# Patient Record
Sex: MALE
[De-identification: names, ages, dates, MRNs, and addresses within clinical notes are randomized per-mention and may not be internally consistent; named-entity substitution may affect disease eponyms.]

---

## 2022-01-05 ENCOUNTER — NURSE TRIAGE (OUTPATIENT)
Dept: OTHER | Facility: CLINIC | Age: 27
End: 2022-01-05

## 2022-01-05 NOTE — TELEPHONE ENCOUNTER
Subjective: Caller states \"I tested positive for COVID 20 days ago. I had a fever for a few days and then it went away. I had a stuffy nose. I did isolate. I got antibody infusion on Sunday after being tested positive. Patient started having pain on right side of chest  On Saturday that feels like a little pressure, could be anxiety and goes to his back. Patient does not have any other signs or symptoms at this time and no chest pain at this time. \"     Current Symptoms: chest pain on right side. Thirsty all the time. Onset: 5 days ago; sudden    Associated Symptoms: NA    Pain Severity: 4/10; pressure; intermittent    Temperature: none     What has been tried: none    LMP: NA Pregnant: NA    Recommended disposition: See PCP within 24 hours. Care advice provided, patient verbalizes understanding; denies any other questions or concerns; instructed to call back for any new or worsening symptoms. Patient/caller to follow-up with PCP Caller does not have a PCP but is going to go to [facility] to be seen and will try to get established PCP. This triage is a result of a call to [address] [place] [address]. Please do not respond to the triage nurse through this encounter. Any subsequent communication should be directly with the patient. Reason for Disposition   [1] Chest pain lasts > 5 minutes AND [2] occurred > 3 days ago (72 hours) AND [3] NO chest pain or cardiac symptoms now    Answer Assessment - Initial Assessment Questions  1. LOCATION: \"Where does it hurt? \"        Right side chest pain that comes and goes, with increased thirst.     2. RADIATION: \"Does the pain go anywhere else? \" (e.g., into neck, jaw, arms, back)      The chest pain goes to back    3. ONSET: \"When did the chest pain begin? \" (Minutes, hours or days)       Sunday 01/02/2022    4. PATTERN \"Does the pain come and go, or has it been constant since it started? \"  \"Does it get worse with exertion? \"       Comes and goes    5.  DURATION: \"How long does it last\" (e.g., seconds, minutes, hours)      20 minutes    6. SEVERITY: \"How bad is the pain? \"  (e.g., Scale 1-10; mild, moderate, or severe)     - MILD (1-3): doesn't interfere with normal activities      - MODERATE (4-7): interferes with normal activities or awakens from sleep     - SEVERE (8-10): excruciating pain, unable to do any normal activities        3-4/10 - pressure    7. CARDIAC RISK FACTORS: \"Do you have any history of heart problems or risk factors for heart disease? \" (e.g., angina, prior heart attack; diabetes, high blood pressure, high cholesterol, smoker, or strong family history of heart disease)      Former smoker- quit 6 years ago    6. PULMONARY RISK FACTORS: \"Do you have any history of lung disease? \"  (e.g., blood clots in lung, asthma, emphysema, birth control pills)      None    9. CAUSE: \"What do you think is causing the chest pain? \"      Heart? 10. OTHER SYMPTOMS: \"Do you have any other symptoms? \" (e.g., dizziness, nausea, vomiting, sweating, fever, difficulty breathing, cough)       anxiety    11. PREGNANCY: \"Is there any chance you are pregnant? \" \"When was your last menstrual period? \"        n/a    Protocols used: CHEST PAIN-ADULT-AH